# Patient Record
Sex: MALE | Race: OTHER
[De-identification: names, ages, dates, MRNs, and addresses within clinical notes are randomized per-mention and may not be internally consistent; named-entity substitution may affect disease eponyms.]

---

## 2018-04-15 ENCOUNTER — HOSPITAL ENCOUNTER (EMERGENCY)
Dept: HOSPITAL 8 - ED | Age: 28
Discharge: HOME | End: 2018-04-15
Payer: COMMERCIAL

## 2018-04-15 VITALS — HEIGHT: 74 IN | BODY MASS INDEX: 36.19 KG/M2 | WEIGHT: 281.97 LBS

## 2018-04-15 VITALS — SYSTOLIC BLOOD PRESSURE: 131 MMHG | DIASTOLIC BLOOD PRESSURE: 71 MMHG

## 2018-04-15 DIAGNOSIS — S61.011A: Primary | ICD-10-CM
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
## 2018-04-15 DIAGNOSIS — T23.471A: ICD-10-CM
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
## 2018-04-15 PROCEDURE — 12011 RPR F/E/E/N/L/M 2.5 CM/<: CPT

## 2018-04-15 PROCEDURE — 90715 TDAP VACCINE 7 YRS/> IM: CPT

## 2018-04-15 PROCEDURE — 90471 IMMUNIZATION ADMIN: CPT

## 2019-07-22 ENCOUNTER — HOSPITAL ENCOUNTER (EMERGENCY)
Facility: MEDICAL CENTER | Age: 29
End: 2019-07-22
Attending: EMERGENCY MEDICINE
Payer: COMMERCIAL

## 2019-07-22 ENCOUNTER — APPOINTMENT (OUTPATIENT)
Dept: RADIOLOGY | Facility: MEDICAL CENTER | Age: 29
End: 2019-07-22
Attending: EMERGENCY MEDICINE
Payer: COMMERCIAL

## 2019-07-22 VITALS
WEIGHT: 275 LBS | RESPIRATION RATE: 16 BRPM | OXYGEN SATURATION: 97 % | HEART RATE: 68 BPM | SYSTOLIC BLOOD PRESSURE: 119 MMHG | BODY MASS INDEX: 35.29 KG/M2 | DIASTOLIC BLOOD PRESSURE: 53 MMHG | HEIGHT: 74 IN | TEMPERATURE: 98.2 F

## 2019-07-22 DIAGNOSIS — S29.012A STRAIN OF THORACIC BACK REGION: ICD-10-CM

## 2019-07-22 PROCEDURE — A9270 NON-COVERED ITEM OR SERVICE: HCPCS | Performed by: EMERGENCY MEDICINE

## 2019-07-22 PROCEDURE — 72072 X-RAY EXAM THORAC SPINE 3VWS: CPT

## 2019-07-22 PROCEDURE — 700102 HCHG RX REV CODE 250 W/ 637 OVERRIDE(OP): Performed by: EMERGENCY MEDICINE

## 2019-07-22 PROCEDURE — 99284 EMERGENCY DEPT VISIT MOD MDM: CPT

## 2019-07-22 RX ORDER — IBUPROFEN 800 MG/1
800 TABLET ORAL EVERY 8 HOURS PRN
Qty: 30 TAB | Refills: 0 | Status: SHIPPED | OUTPATIENT
Start: 2019-07-22

## 2019-07-22 RX ORDER — CYCLOBENZAPRINE HCL 10 MG
10 TABLET ORAL 3 TIMES DAILY PRN
Qty: 30 TAB | Refills: 0 | Status: SHIPPED | OUTPATIENT
Start: 2019-07-22

## 2019-07-22 RX ORDER — HYDROCODONE BITARTRATE AND ACETAMINOPHEN 5; 325 MG/1; MG/1
2 TABLET ORAL ONCE
Status: COMPLETED | OUTPATIENT
Start: 2019-07-22 | End: 2019-07-22

## 2019-07-22 RX ADMIN — HYDROCODONE BITARTRATE AND ACETAMINOPHEN 2 TABLET: 5; 325 TABLET ORAL at 11:39

## 2019-07-22 NOTE — ED TRIAGE NOTES
Felt sharp pain in between shoulder blades post moving a heavy object at work. No hx of back injuries, no meds, hx or allergies.

## 2019-07-22 NOTE — ED PROVIDER NOTES
"ED Provider Note    CHIEF COMPLAINT  Chief Complaint   Patient presents with   • Back Pain       HPI  De Edwards is a 29 y.o. male who presents for evaluation of acute mid back pain while at work.  The patient was lifting a heavy object felt a popping sensation between his shoulder blades.  He denies chest pain.  He has no significant medical or surgical history.  No report of numbness weakness or tingling.  No prior history of neck or back surgery.  Injury occurred an hour ago.  Worse with movement no associated numbness weakness tingling no other complaint    REVIEW OF SYSTEMS  See HPI for further details no numbness weakness tingling chest pain ll other systems are negative.     PAST MEDICAL HISTORY  No past medical history on file.  No significant medical history  FAMILY HISTORY  Noncontributory    SOCIAL HISTORY  Social History     Social History   • Marital status: Single     Spouse name: N/A   • Number of children: N/A   • Years of education: N/A     Social History Main Topics   • Smoking status: Not on file   • Smokeless tobacco: Not on file   • Alcohol use Not on file   • Drug use: Unknown   • Sexual activity: Not on file     Other Topics Concern   • Not on file     Social History Narrative   • No narrative on file     Denies IV drug  SURGICAL HISTORY  No past surgical history on file.  No neck or back surgery  CURRENT MEDICATIONS  Home Medications     Reviewed by Heaven Henry R.N. (Registered Nurse) on 07/22/19 at 1009  Med List Status: Not Addressed   Medication Last Dose Status        Patient Bill Taking any Medications                       ALLERGIES  No Known Allergies    PHYSICAL EXAM  VITAL SIGNS: /80   Pulse 60   Temp 36.8 °C (98.2 °F) (Temporal)   Resp 14   Ht 1.88 m (6' 2\")   Wt 124.7 kg (275 lb)   SpO2 97%   BMI 35.31 kg/m²  Room air O2: 97    Constitutional: Patient appears uncomfortable  HENT: Normocephalic, Atraumatic, Bilateral external ears normal, " Oropharynx moist, No oral exudates, Nose normal.   Eyes: PERRLA, EOMI, Conjunctiva normal, No discharge.   Neck: Normal range of motion, No tenderness, Supple, No stridor.   Cardiovascular: Normal heart rate, Normal rhythm, No murmurs, No rubs, No gallops.   Thorax & Lungs: Normal breath sounds, No respiratory distress, No wheezing, No chest tenderness.   Abdomen: Bowel sounds normal, Soft, No tenderness, No masses, No pulsatile masses.   Skin: Warm, Dry, No erythema, No rash.   Back: Midline bony tenderness at T6-T7 without step-off  Extremities: Intact distal pulses, No edema, No tenderness, No cyanosis, No clubbing.   Neurologic: Alert & oriented x 3, Normal motor function, Normal sensory function, No focal deficits noted.   Psychiatric: Anxious.     RADIOLOGY/PROCEDURES  DX-THORACIC SPINE-WITH SWIMMERS VIEW   Final Result      Mild anterior wedging T11 and T12 vertebral bodies and appears to be old. No acute compression identified. If symptoms persist, CT would be a consideration for further evaluation.            COURSE & MEDICAL DECISION MAKING  Pertinent Labs & Imaging studies reviewed. (See chart for details)  Patient presents here with acute mid back pain there is no direct blunt or penetrating trauma.  Neurologically is intact.  He was in moderate pain was given Norco.  Radio graphs suggest some anterior wedging of T12 and T11 likely old.  Without any direct trauma this is likely chronic issue exacerbated by lifting.  He will be referred back to occupational health he can be referred to spine surgery and get CT scan as needed.  I will give him a prescription of high-dose ibuprofen and Flexeril    FINAL IMPRESSION  1.  Acute thoracic back strain         Electronically signed by: Shiva Wilcox, 7/22/2019 10:14 AM

## 2019-07-22 NOTE — ED NOTES
Cleared for DC per ER MD. DC instructions provided and patient verbalizes understanding. NAD noted. VSS. Patient amb with steady gait.

## 2019-07-22 NOTE — LETTER
"  FORM C-4:  EMPLOYEE’S CLAIM FOR COMPENSATION/ REPORT OF INITIAL TREATMENT  EMPLOYEE’S CLAIM - PROVIDE ALL INFORMATION REQUESTED   First Name  De Last Name  Ronni Justin Birthdate             Age  1990 29 y.o. 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 (if applicable)  Picking up a board    How did this injury or occupational disease occur? Be specific and answer in detail. 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  n/a Witnesses to the Accident  Kevin Karen     Nature of Injury or Occupational Disease  Contusion  Part(s) of Body Injured or Affected  Lower Back Area (Lumbar Area & Lumbo-Sacral), N/A, N/A    I certify that the above is true and correct to the best of my knowledge and that I have provided this information in order to obtain the benefits of [place] " [place]).  I hereby authorize any physician, chiropractor, surgeon, practitioner, or other person, any hospital, including [facility] hospital, any medical service organization, any insurance company, or other institution or organization to release to each other, any medical or other information, including benefits paid or payable, pertinent to this injury or disease, except information relative to diagnosis, treatment and/or counseling for AIDS, psychological conditions, alcohol or controlled substances, for which I must give specific authorization.  A Photostat of this authorization shall be as valid as the original.   Date                07/22/2019 [facility] Employee’s Signature   THIS REPORT MUST BE COMPLETED AND MAILED WITHIN 3 WORKING DAYS OF TREATMENT   Place  [facility] [place], [place], EMERGENCY DEPT  Name of Facility   [facility] [place] [place]   Date  7/22/2019 Diagnosis  (S29.012A) Strain of thoracic back region Is there evidence the injured employee was under the influence of alcohol and/or another controlled substance at the time of accident?   Hour  12:30 PM Description of Injury or Disease  Strain of thoracic back region No   Treatment  Physician evaluation and treatment of acute thoracic back strain  Have you advised the patient to remain off work five days or more?         Yes   X-Ray Findings  Positive  Comments:Possible wedging of T11 and T12   If Yes   From Date    To Date      From information given by the employee, together with medical evidence, can you directly connect this injury or occupational disease as job incurred?  Yes If No, is the employee capable of: Full Duty  No Modified Duty  No   Is additional medical care by a physician indicated?  Yes  Comments:Referral to occupational health If Modified Duty, Specify any Limitations / Restrictions  No  "working until following up with occupational health     Do you know of any previous injury or disease contributing to this condition or occupational disease?  No   Date  7/22/2019 Print Doctor’s Name  Shiva Wilcox I certify the employer’s copy of this form was mailed on:   07/22/2019   Address  [address]  [place] [address] [phone] Insurer’s Use Only   [facility]  [address]    Provider’s Tax ID Number  [id number] Telephone  Dept: [phone]    Doctor’s Signature  e-SHIVA Gonzales M.D. Degree   MD    Original - TREATING PHYSICIAN OR CHIROPRACTOR   Pg 2-Insurer/TPA   Pg 3-Employer   Pg 4-Employee                                                                                                  Form C-4 (rev01/03)     BRIEF DESCRIPTION OF RIGHTS AND BENEFITS  (Pursuant to [place] 616C.050)    Notice of Injury or Occupational Disease (Incident Report Form C-1): If an injury or occupational disease (OD) arises out of and in the course of employment, you must provide written notice to your employer as soon as practicable, but no later than 7 days after the accident or OD. Your employer shall maintain a sufficient supply of the required forms.    Claim for Compensation (Form C-4): If medical treatment is sought, the form C-4 is available at the place of initial treatment. A completed \"Claim for Compensation\" (Form C-4) must be filed within 90 days after an accident or OD. The treating physician or chiropractor must, within 3 working days after treatment, complete and mail to the employer, the employer's insurer and third-party , the Claim for Compensation.    Medical Treatment: If you require medical treatment for your on-the-job injury or OD, you may be required to select a physician or chiropractor from a list provided by your workers’ compensation insurer, if it has contracted with an Organization for Managed Care (MCO) or Preferred Provider Organization (PPO) or " providers of health care. If your employer has not entered into a contract with an MCO or PPO, you may select a physician or chiropractor from the Panel of Physicians and Chiropractors. Any medical costs related to your industrial injury or OD will be paid by your insurer.    Temporary Total Disability (TTD): If your doctor has certified that you are unable to work for a period of at least 5 consecutive days, or 5 cumulative days in a 20-day period, or places restrictions on you that your employer does not accommodate, you may be entitled to TTD compensation.    Temporary Partial Disability (TPD): If the wage you receive upon reemployment is less than the compensation for TTD to which you are entitled, the insurer may be required to pay you TPD compensation to make up the difference. TPD can only be paid for a maximum of 24 months.    Permanent Partial Disability (PPD): When your medical condition is stable and there is an indication of a PPD as a result of your injury or OD, within 30 days, your insurer must arrange for an evaluation by a rating physician or chiropractor to determine the degree of your PPD. The amount of your PPD award depends on the date of injury, the results of the PPD evaluation and your age and wage.    Permanent Total Disability (PTD): If you are medically certified by a treating physician or chiropractor as permanently and totally disabled and have been granted a PTD status by your insurer, you are entitled to receive monthly benefits not to exceed 66 2/3% of your average monthly wage. The amount of your PTD payments is subject to reduction if you previously received a PPD award.    Vocational Rehabilitation Services: You may be eligible for vocational rehabilitation services if you are unable to return to the job due to a permanent physical impairment or permanent restrictions as a result of your injury or occupational disease.    Transportation and Per Aileen Reimbursement: You may be  eligible for travel expenses and per cliff associated with medical treatment.  Reopening: You may be able to reopen your claim if your condition worsens after claim closure.    Appeal Process: If you disagree with a written determination issued by the insurer or the insurer does not respond to your request, you may appeal to the Department of Administration, , by following the instructions contained in your determination letter. You must appeal the determination within 70 days from the date of the determination letter at [address], [address], [place] [address], or [address], [place], [place] [address]. If you disagree with the  decision, you may appeal to the Department of Administration, . You must file your appeal within 30 days from the date of the  decision letter at [address], [address], [place], [place] [address], or [address], [address], [place], [place] [address]. If you disagree with a decision of an , you may file a petition for judicial review with the District Court. You must do so within 30 days of the Appeal Officer’s decision. You may be represented by an  at your own expense or you may contact the [facility] for possible representation.    [place]  for Injured Workers (NAIW): If you disagree with a  decision, you may request that NAIW represent you without charge at an  Hearing. For information regarding denial of benefits, you may contact the [facility] at: [address], [address], [place] [address], [phone]) [phone], or [address], [place] [address], [phone]    To File a Complaint with the Division: If you wish to file a complaint with the  of the Division of Industrial Relations (DIR), please contact the Workers’ Compensation Section, [address], [address], [place],  [place] [address], telephone ([phone]) [phone], or [address], [address], [place], [place] [address], telephone [phone]) [phone].    For assistance with Workers’ Compensation Issues: you may contact the Office of the Governor Consumer Health Assistance, [address] [place] [address], [address], [place], [place] [address], Toll Free 1-663.932.8545, Web site: [email or web address].[email or web address]., E-mail [email or web address].                                                                                                                                                                               __________________________________________________________________                                    _________07/22/2019________            Employee Name / Signature                                                                                                                            Date                                       D-2 (rev. 10/07)

## 2019-07-23 ENCOUNTER — OCCUPATIONAL MEDICINE (OUTPATIENT)
Dept: URGENT CARE | Facility: CLINIC | Age: 29
End: 2019-07-23
Payer: COMMERCIAL

## 2019-07-23 VITALS
DIASTOLIC BLOOD PRESSURE: 64 MMHG | OXYGEN SATURATION: 97 % | HEART RATE: 72 BPM | HEIGHT: 74 IN | RESPIRATION RATE: 14 BRPM | WEIGHT: 275 LBS | TEMPERATURE: 98.6 F | SYSTOLIC BLOOD PRESSURE: 122 MMHG | BODY MASS INDEX: 35.29 KG/M2

## 2019-07-23 DIAGNOSIS — S29.019D THORACIC MYOFASCIAL STRAIN, SUBSEQUENT ENCOUNTER: ICD-10-CM

## 2019-07-23 DIAGNOSIS — R25.2 SPASM: ICD-10-CM

## 2019-07-23 PROCEDURE — 99203 OFFICE O/P NEW LOW 30 MIN: CPT | Performed by: FAMILY MEDICINE

## 2019-07-23 RX ORDER — METHOCARBAMOL 750 MG/1
750 TABLET, FILM COATED ORAL 3 TIMES DAILY
Qty: 15 TAB | Refills: 0 | Status: SHIPPED | OUTPATIENT
Start: 2019-07-23 | End: 2019-07-28

## 2019-07-23 NOTE — LETTER
[address] [place] [address]  Phone:  [phone] - Fax:  [phone]   [facility] Progress Report and Disability Certification  Date of Service: 7/23/2019   No Show:  No  Date / Time of Next Visit: 7/26/2019@7:30 PM   Claim Information   Patient Name: De Edwards  Claim Number:     Employer:   [employer] Date of Injury: 7/22/2019     Insurer / TPA: [place] [employer]  ID / SSN:     Occupation: Plaster  Diagnosis: Diagnoses of Thoracic myofascial strain, subsequent encounter and Spasm were pertinent to this visit.    Medical Information   Related to Industrial Injury? Yes    Subjective Complaints:  DOI: 7/22/2019  F/u visit from ER for thoracic strain triggered by bending with heavy piece of wood to level ground. Has improved slightly.   Pain severity 8/10 without radiation. Worse with walking. Spasm takes breath away. Muscle relaxer and ibuprofen with minimal relief. No radiation. No myelopathy. No other aggravating or alleviating factors.     Objective Findings: Back: tender and spasm left parathoracic musculature. No midline bony tenderness or deformity. Pain reproduced with left trunk rotation.  Neuro:  =   Pre-Existing Condition(s):     Assessment:   Condition Improved    Status: Additional Care Required  Permanent Disability:No    Plan:   Comments:relative rest, ice, nsaid, muscle relaxer    Diagnostics:   Comments:reviewed from ER    Comments:       Disability Information   Status: Released to Restricted Duty    From:  7/23/2019  Through: 7/26/2019 Restrictions are: Temporary   Physical Restrictions   Sitting:    Standing:    Stooping:  < or = to 1 hr/day Bending:  < or = to 1 hr/day   Squatting:    Walking:  < or = to 2 hrs/day Climbing:    Pushing:  < or = to 2 hrs/day   Pulling:  < or = to 2 hrs/day Other:    Reaching Above Shoulder (L):   Reaching Above Shoulder (R):       Reaching Below Shoulder (L):    Reaching Below  Shoulder (R):      Not to exceed Weight Limits   Carrying(hrs): 2 Weight Limit(lb): < or = to 10 pounds Lifting(hrs): 2 Weight  Limit(lb): < or = to 10 pounds   Comments:      Repetitive Actions   Hands: i.e. Fine Manipulations from Grasping:     Feet: i.e. Operating Foot Controls:     Driving / Operate Machinery:     Physician Name: Prince Boogie M.D. Physician Signature: PRINCE Peng M.D. e-Signature: Dr. Akin Esquivel, Medical Director   Clinic Name / Location: [address] [place] [address], [place] [address] Clinic Phone Number: Dept: [phone]   Appointment Time: 8:15 Pm Visit Start Time: 8:15 PM   Check-In Time:  8:07 Pm Visit Discharge Time:  8:48 PM   Original-Treating Physician or Chiropractor    Page 2-Insurer/TPA    Page 3-Employer    Page 4-Employee

## 2019-07-27 ENCOUNTER — OCCUPATIONAL MEDICINE (OUTPATIENT)
Dept: URGENT CARE | Facility: CLINIC | Age: 29
End: 2019-07-27
Payer: COMMERCIAL

## 2019-07-27 VITALS
HEIGHT: 74 IN | SYSTOLIC BLOOD PRESSURE: 138 MMHG | RESPIRATION RATE: 12 BRPM | OXYGEN SATURATION: 98 % | TEMPERATURE: 97.4 F | DIASTOLIC BLOOD PRESSURE: 72 MMHG | WEIGHT: 288.2 LBS | BODY MASS INDEX: 36.99 KG/M2 | HEART RATE: 67 BPM

## 2019-07-27 DIAGNOSIS — S29.019D THORACIC MYOFASCIAL STRAIN, SUBSEQUENT ENCOUNTER: ICD-10-CM

## 2019-07-27 PROCEDURE — 99213 OFFICE O/P EST LOW 20 MIN: CPT | Mod: 29 | Performed by: NURSE PRACTITIONER

## 2019-07-27 ASSESSMENT — ENCOUNTER SYMPTOMS: BACK PAIN: 1

## 2019-07-27 NOTE — LETTER
[address] [place] [address]  Phone:  [phone] - Fax:  [phone]   [facility] Progress Report and Disability Certification  Date of Service: 7/27/2019   No Show:  No  Date / Time of Next Visit: 8/3/2019 7:30 PM   Claim Information   Patient Name: De Edwards  Claim Number:     Employer:   [employer] Date of Injury: 7/22/2019     Insurer / TPA: [place] Ronnie  ID / SSN:     Occupation: Plaster  Diagnosis: The encounter diagnosis was Thoracic myofascial strain, subsequent encounter.    Medical Information   Related to Industrial Injury?      Subjective Complaints:  DOI: 7/22/19  Patient states he has improved from his last follow up on 7/23.  States he is feeling less pain and fewer spasms to the thoracic area.  Pain is mainly produced now with movement such as lateral rotation and bending.  States he has been using icy-hot and alternating ice/heat with ibuprofen with moderate relief. Denies numbness, tingling or weakness.   Objective Findings: No point tenderness over the thoracic spine, no point tenderness over the injured area. Pain is reproduced with lateral rotation and lateral bending. Full ROM in the upper extremities without pain.    Pre-Existing Condition(s):     Assessment:   Condition Improved    Status:    Permanent Disability:     Plan: Medication  Comments:ibuprofen as needed    Diagnostics:      Comments:       Disability Information   Status: Released to Restricted Duty    From:  7/27/2019  Through: 8/3/2019 Restrictions are: Temporary   Physical Restrictions   Sitting:    Standing:    Stooping:  < or = to 1 hr/day Bending:  < or = to 1 hr/day   Squatting:    Walking:  < or = to 2 hrs/day Climbing:    Pushing:  < or = to 2 hrs/day   Pulling:  < or = to 2 hrs/day Other:    Reaching Above Shoulder (L):   Reaching Above Shoulder (R):       Reaching Below Shoulder (L):    Reaching Below Shoulder (R):      Not to  exceed Weight Limits   Carrying(hrs): 2 Weight Limit(lb): < or = to 10 pounds Lifting(hrs): 2 Weight  Limit(lb): < or = to 10 pounds   Comments: Continue ibuprofen as needed.  Ice/heat. Keep follow up visit with occupational health.    Repetitive Actions   Hands: i.e. Fine Manipulations from Grasping:     Feet: i.e. Operating Foot Controls:     Driving / Operate Machinery:     Physician Name: Cathey J Hamman, A.P.N. Physician Signature: e-SignHAMMAN, CATHEY J A.P.N. e-Signature: Dr. Akin Esquivel, Medical Director   Clinic Name / Location: [address] [place] [address], [place] [address] Clinic Phone Number: Dept: [phone]   Appointment Time: 7:30 Pm Visit Start Time: 7:40 PM   Check-In Time:  7:33 Pm Visit Discharge Time: 8:08 PM   Original-Treating Physician or Chiropractor    Page 2-Insurer/TPA    Page 3-Employer    Page 4-Employee

## 2019-07-28 ASSESSMENT — ENCOUNTER SYMPTOMS
WEIGHT LOSS: 0
NAUSEA: 0
SENSORY CHANGE: 0
FEVER: 0
FLANK PAIN: 0
VOMITING: 0
CHILLS: 0
FOCAL WEAKNESS: 0

## 2019-07-28 NOTE — PROGRESS NOTES
"Subjective:      De Edwards is a 29 y.o. male who presents with Work-Related Injury ( [facility] DOI: 07-22-19 Thoracic Myofascial strain)      DOI: 7/22/19  Patient states he has improved from his last follow up on 7/23.  States he is feeling less pain and fewer spasms to the thoracic area.  Pain is mainly produced now with movement such as lateral rotation and bending.  States he has been using icy-hot and alternating ice/heat with ibuprofen with moderate relief. Denies numbness, tingling or weakness.     Other   This is a new problem. Episode onset: 7/22/19. The problem occurs constantly. The problem has been gradually improving. The symptoms are aggravated by bending and twisting. He has tried ice, NSAIDs, heat, rest and relaxation for the symptoms. The treatment provided moderate relief.       Review of Systems   Musculoskeletal: Positive for back pain.   All other systems reviewed and are negative.         Objective:     /72   Pulse 67   Temp 36.3 °C (97.4 °F) (Temporal)   Resp 12   Ht 1.88 m (6' 2\")   Wt (!) 130.7 kg (288 lb 3.2 oz)   SpO2 98%   BMI 37.00 kg/m²      Physical Exam   Constitutional: He appears well-developed and well-nourished.   Musculoskeletal:        Back:    Vitals reviewed.      No point tenderness over the thoracic spine, no point tenderness over the injured area. Pain is reproduced with lateral rotation and lateral bending. Full ROM in the upper extremities without pain.      5/5 and equal in the upper extremities  Push/pull 5/5 and equal in the upper extremities.     Assessment/Plan:     1. Thoracic myofascial strain, subsequent encounter    See D39 for restrictions  Ice/heat  Ibuprofen  Keep follow up visit in occupational health       "

## 2019-07-28 NOTE — PROGRESS NOTES
"Subjective:      De Edwards is a 29 y.o. male who presents with Follow-Up (WC Lt back pain not getting better and pt states the medication is not working )      DOI: 7/22/2019  F/u visit from ER for thoracic strain triggered by bending with heavy piece of wood to level ground. Has improved slightly.   Pain severity 8/10 without radiation. Worse with walking. Spasm takes breath away. Muscle relaxer and ibuprofen with minimal relief. No radiation. No myelopathy. No other aggravating or alleviating factors.       HPI    Review of Systems   Constitutional: Negative for chills, fever and weight loss.   Gastrointestinal: Negative for nausea and vomiting.   Genitourinary: Negative for flank pain and hematuria.   Neurological: Negative for sensory change and focal weakness.     .  Medications, Allergies, and current problem list reviewed today in Epic       Objective:     /64   Pulse 72   Temp 37 °C (98.6 °F)   Resp 14   Ht 1.88 m (6' 2\")   Wt 124.7 kg (275 lb)   SpO2 97%   BMI 35.31 kg/m²      Physical Exam   Constitutional: He is oriented to person, place, and time. He appears well-developed and well-nourished. No distress.   HENT:   Head: Normocephalic and atraumatic.   Neurological: He is alert and oriented to person, place, and time.   Gait stable   Skin: Skin is warm and dry. No rash noted.       Back: tender and spasm left parathoracic musculature. No midline bony tenderness or deformity. Pain reproduced with left trunk rotation.  Neuro:  =       Assessment/Plan:     1. Thoracic myofascial strain, subsequent encounter      2. Spasm    - methocarbamol (ROBAXIN) 750 MG Tab; Take 1 Tab by mouth 3 times a day for 5 days.  Dispense: 15 Tab; Refill: 0    My duty restrictions on D 39.  We will try different muscle relaxer.  Continue NSAID.  "

## 2019-08-03 ENCOUNTER — OCCUPATIONAL MEDICINE (OUTPATIENT)
Dept: URGENT CARE | Facility: CLINIC | Age: 29
End: 2019-08-03
Payer: COMMERCIAL

## 2019-08-03 VITALS
HEART RATE: 81 BPM | SYSTOLIC BLOOD PRESSURE: 124 MMHG | DIASTOLIC BLOOD PRESSURE: 64 MMHG | TEMPERATURE: 98.1 F | WEIGHT: 275.2 LBS | OXYGEN SATURATION: 97 % | RESPIRATION RATE: 12 BRPM | HEIGHT: 74 IN | BODY MASS INDEX: 35.32 KG/M2

## 2019-08-03 DIAGNOSIS — S29.019D THORACIC MYOFASCIAL STRAIN, SUBSEQUENT ENCOUNTER: ICD-10-CM

## 2019-08-03 PROCEDURE — 99213 OFFICE O/P EST LOW 20 MIN: CPT | Mod: 29 | Performed by: PHYSICIAN ASSISTANT

## 2019-08-03 ASSESSMENT — ENCOUNTER SYMPTOMS
BACK PAIN: 0
TINGLING: 0

## 2019-08-03 NOTE — LETTER
[address] [place] [address]  Phone:  [phone] - Fax:  [phone]   [facility] Progress Report and Disability Certification  Date of Service: 8/3/2019   No Show:  No  Date / Time of Next Visit: 8/10/2019 7:30 PM   Claim Information   Patient Name: De Edwards  Claim Number:     Employer:    Date of Injury: 7/22/2019     Insurer / TPA: [employer]  ID / SSN:     Occupation: Plaster  Diagnosis: The encounter diagnosis was Thoracic myofascial strain, subsequent encounter.    Medical Information   Related to Industrial Injury? Yes    Subjective Complaints:  DOI 7/22/2019: Patient presents for thoracic back injury that occurred at work when picking up boards and bending over.  Patient states he has been progressively increasing the amount of work he does and feels no pain or issues with his back.  He states he is ready to have a trial of full duty at this point in time.  He does still occasionally use muscle relaxers as needed as well as IcyHot on the area for relief.  Patient states he feels like he has back to about 80%.   Objective Findings: Patient has 5/5 strength in upper extremities against resistance of extension and flexion.  Nontender to palpation of midline and thoracic back.  Patient is able to bend over with no pain.   Pre-Existing Condition(s):     Assessment:   Condition Improved    Status: Additional Care Required  Permanent Disability:No    Plan:      Diagnostics:      Comments:       Disability Information   Status: Released to Full Duty    From:  8/3/2019  Through: 8/10/2019 Restrictions are: Temporary   Physical Restrictions   Sitting:    Standing:    Stooping:    Bending:      Squatting:    Walking:    Climbing:    Pushing:      Pulling:    Other:    Reaching Above Shoulder (L):   Reaching Above Shoulder (R):       Reaching Below Shoulder (L):    Reaching Below Shoulder (R):      Not to exceed Weight  Limits   Carrying(hrs):   Weight Limit(lb):   Lifting(hrs):   Weight  Limit(lb):     Comments: Patient to have a trial of full duty.  He will follow-up for reevaluation in 1 week.  Continue using icy hot on the area as needed and muscle relaxer only sparingly as needed.  Likely will be discharged at next visit with similar progression as he has seen with previous visits.    Repetitive Actions   Hands: i.e. Fine Manipulations from Grasping:     Feet: i.e. Operating Foot Controls:     Driving / Operate Machinery:     Physician Name: Michael Castillo P.A.-C. Physician Signature:   e-Signature: Dr. Akin Esquivel, Medical Director   Clinic Name / Location: [address] [place] [address] Clinic Phone Number: Dept: [phone]   Appointment Time: 7:30 Pm Visit Start Time: 7:59 PM   Check-In Time:  7:29 Pm Visit Discharge Time: 8:28 PM   Original-Treating Physician or Chiropractor    Page 2-Insurer/TPA    Page 3-Employer    Page 4-Employee

## 2019-08-04 NOTE — PROGRESS NOTES
"Subjective:      De Edwards is a 29 y.o. male who presents with Work-Related Injury ( [facility] DOI: 07-22-19 Myofascial strain)      DOI 7/22/2019: Patient presents for thoracic back injury that occurred at work when picking up boards and bending over.  Patient states he has been progressively increasing the amount of work he does and feels no pain or issues with his back.  He states he is ready to have a trial of full duty at this point in time.  He does still occasionally use muscle relaxers as needed as well as IcyHot on the area for relief.  Patient states he feels like he has back to about 80%.     HPI  Patient presents for follow-up of work-related injury as described above.  Review of Systems   Musculoskeletal: Negative for back pain.   Neurological: Negative for tingling.     PMH: No pertinent past medical history to this problem  MEDS: Medications were reviewed in Epic  ALLERGIES: Allergies were reviewed in Epic  SOCHX: Works as a plaster technician.  FH: No pertinent family history to this problem       Objective:     /64   Pulse 81   Temp 36.7 °C (98.1 °F) (Temporal)   Resp 12   Ht 1.88 m (6' 2\")   Wt 124.8 kg (275 lb 3.2 oz)   SpO2 97%   BMI 35.33 kg/m²      Physical Exam   Constitutional: Vital signs are normal. He appears well-developed and well-nourished. No distress.   HENT:   Head: Normocephalic and atraumatic.   Right Ear: Hearing normal.   Left Ear: Hearing normal.   Eyes: Pupils are equal, round, and reactive to light.   Cardiovascular: Normal rate, regular rhythm and normal heart sounds.   Pulmonary/Chest: Effort normal.   Musculoskeletal:   Thoracic back as described below   Neurological: He is alert. Coordination normal.   Skin: Skin is warm and dry.   Psychiatric: He has a normal mood and affect.   Nursing note and vitals reviewed.      Patient has 5/5 strength in upper extremities against resistance of extension and flexion.  Nontender to palpation of midline and " thoracic back.  Patient is able to bend over with no pain.       Assessment/Plan:   1. Thoracic myofascial strain, subsequent encounter  Patient to have a trial of full duty.  He will follow-up for reevaluation in 1 week.  Continue using icy hot on the area as needed and muscle relaxer only sparingly as needed.  Likely will be discharged at next visit with similar progression as he has seen with previous visits.   Patient agreeable to plan  Michael Castillo PA-C

## 2019-11-19 ENCOUNTER — HOSPITAL ENCOUNTER (EMERGENCY)
Facility: MEDICAL CENTER | Age: 29
End: 2019-11-19
Attending: EMERGENCY MEDICINE

## 2019-11-19 ENCOUNTER — APPOINTMENT (OUTPATIENT)
Dept: RADIOLOGY | Facility: MEDICAL CENTER | Age: 29
End: 2019-11-19
Attending: EMERGENCY MEDICINE

## 2019-11-19 VITALS
OXYGEN SATURATION: 97 % | HEIGHT: 74 IN | HEART RATE: 59 BPM | DIASTOLIC BLOOD PRESSURE: 61 MMHG | SYSTOLIC BLOOD PRESSURE: 119 MMHG | RESPIRATION RATE: 16 BRPM | TEMPERATURE: 97 F | BODY MASS INDEX: 35.93 KG/M2 | WEIGHT: 279.98 LBS

## 2019-11-19 DIAGNOSIS — S93.492A SPRAIN OF ANTERIOR TALOFIBULAR LIGAMENT OF LEFT ANKLE, INITIAL ENCOUNTER: ICD-10-CM

## 2019-11-19 PROCEDURE — 302874 HCHG BANDAGE ACE 2 OR 3""

## 2019-11-19 PROCEDURE — 29515 APPLICATION SHORT LEG SPLINT: CPT

## 2019-11-19 PROCEDURE — 99284 EMERGENCY DEPT VISIT MOD MDM: CPT

## 2019-11-19 PROCEDURE — 73610 X-RAY EXAM OF ANKLE: CPT | Mod: LT

## 2019-11-19 PROCEDURE — 302875 HCHG BANDAGE ACE 4 OR 6""

## 2019-11-19 NOTE — ED NOTES
Discharged to home with instructions to follow up with ortho, elevate and ice leg and use crutches.

## 2019-11-19 NOTE — ED PROVIDER NOTES
"ED Provider Note      CHIEF COMPLAINT   Chief Complaint   Patient presents with   • Ankle Injury     left, jumped off a ladder last night and now co left ankle pain.       HPI   De Edwards is a 29 y.o. male who presents with left ankle pain.  Stepped off a ladder.  Twisted ankle.  Injury occurred last night.  Progressive swelling and increased pain today.  Throbbing nonradiating worse with ambulation took ibuprofen before coming in.  No numbness tingling weakness.  No other trauma.    REVIEW OF SYSTEMS   Pertinent negative: As above    PAST MEDICAL HISTORY   No past medical history on file.    SOCIAL HISTORY  Social History     Tobacco Use   • Smoking status: Current Some Day Smoker   • Smokeless tobacco: Never Used   Substance Use Topics   • Alcohol use: No   • Drug use: No       ALLERGIES   See chart    PHYSICAL EXAM  VITAL SIGNS: /61   Pulse (!) 59   Temp 36.1 °C (97 °F)   Resp 16   Ht 1.88 m (6' 2\")   Wt (!) 127 kg (279 lb 15.8 oz)   SpO2 97%   BMI 35.95 kg/m²   Head: Atraumatic  Eyes: Eyes normal inspection  Neck: has full range of motion, normal inspection.  Constitutional: No acute distress   Cardiovascular: Normal heart rate. Pulses dorsalis pedis  Thorax & Lungs: No respiratory distress  Skin: Intact  Musculoskeletal: Dorsalis pedis.  Tenderness lateral malleolus with associated swelling.  Compartments soft.  Neurologic:  Normal sensory and motor    RADIOLOGY/PROCEDURES  DX-ANKLE 3+ VIEWS LEFT   Final Result      Soft tissue swelling without definite acute displaced fracture      Periarticular calcifications and anterior tibial spurring most likely reflect remote injuries although superimposed acute avulsion fractures are not excluded         Imaging is interpreted by radiologist reviewed by me    COURSE & MEDICAL DECISION MAKING  Analgesia for possible fracture-ibuprofen before coming in    She presents with inversion injury left ankle.  Took ibuprofen before coming in.  X-ray " was obtained and there is no definite acute fracture although has calcifications distal to the malleoli.  Patient will be placed in a posterior short leg splint on crutches.  Referred to orthopedics for recheck in 1 week to rule out fracture.  Return the ER for worsening or uncontrolled pain or concern.    FINAL IMPRESSION  1.  Left ankle sprain, rule out fracture      This dictation was created using voice recognition software. The accuracy of the dictation is limited to the abilities of the software. I expect there may be some errors of grammar and possibly content. The nursing notes were reviewed and certain aspects of this information were incorporated into this note.      Electronically signed by: Hilton Garcia, 11/19/2019 12:35 PM

## 2019-11-19 NOTE — ED TRIAGE NOTES
Chief Complaint   Patient presents with   • Ankle Injury     left, jumped off a ladder last night and now co left ankle pain.

## 2021-03-03 ENCOUNTER — HOSPITAL ENCOUNTER (OUTPATIENT)
Dept: RADIOLOGY | Facility: MEDICAL CENTER | Age: 31
End: 2021-03-03
Attending: NURSE PRACTITIONER
Payer: COMMERCIAL

## 2021-03-03 DIAGNOSIS — R05.9 COUGH: ICD-10-CM

## 2021-03-03 PROCEDURE — 71046 X-RAY EXAM CHEST 2 VIEWS: CPT

## 2021-03-16 ENCOUNTER — HOSPITAL ENCOUNTER (OUTPATIENT)
Dept: LAB | Facility: MEDICAL CENTER | Age: 31
End: 2021-03-16
Attending: NURSE PRACTITIONER
Payer: COMMERCIAL

## 2021-03-16 LAB
BASOPHILS # BLD AUTO: 0.9 % (ref 0–1.8)
BASOPHILS # BLD: 0.08 K/UL (ref 0–0.12)
EOSINOPHIL # BLD AUTO: 0.28 K/UL (ref 0–0.51)
EOSINOPHIL NFR BLD: 3.1 % (ref 0–6.9)
ERYTHROCYTE [DISTWIDTH] IN BLOOD BY AUTOMATED COUNT: 44.8 FL (ref 35.9–50)
HCT VFR BLD AUTO: 46.7 % (ref 42–52)
HGB BLD-MCNC: 15.7 G/DL (ref 14–18)
IMM GRANULOCYTES # BLD AUTO: 0.04 K/UL (ref 0–0.11)
IMM GRANULOCYTES NFR BLD AUTO: 0.4 % (ref 0–0.9)
LYMPHOCYTES # BLD AUTO: 2.99 K/UL (ref 1–4.8)
LYMPHOCYTES NFR BLD: 32.6 % (ref 22–41)
MCH RBC QN AUTO: 31 PG (ref 27–33)
MCHC RBC AUTO-ENTMCNC: 33.6 G/DL (ref 33.7–35.3)
MCV RBC AUTO: 92.1 FL (ref 81.4–97.8)
MONOCYTES # BLD AUTO: 0.56 K/UL (ref 0–0.85)
MONOCYTES NFR BLD AUTO: 6.1 % (ref 0–13.4)
NEUTROPHILS # BLD AUTO: 5.23 K/UL (ref 1.82–7.42)
NEUTROPHILS NFR BLD: 56.9 % (ref 44–72)
NRBC # BLD AUTO: 0 K/UL
NRBC BLD-RTO: 0 /100 WBC
PLATELET # BLD AUTO: 275 K/UL (ref 164–446)
PMV BLD AUTO: 9.4 FL (ref 9–12.9)
RBC # BLD AUTO: 5.07 M/UL (ref 4.7–6.1)
WBC # BLD AUTO: 9.2 K/UL (ref 4.8–10.8)

## 2021-03-16 PROCEDURE — 87591 N.GONORRHOEAE DNA AMP PROB: CPT

## 2021-03-16 PROCEDURE — 80061 LIPID PANEL: CPT

## 2021-03-16 PROCEDURE — 80053 COMPREHEN METABOLIC PANEL: CPT

## 2021-03-16 PROCEDURE — 87389 HIV-1 AG W/HIV-1&-2 AB AG IA: CPT

## 2021-03-16 PROCEDURE — 87491 CHLMYD TRACH DNA AMP PROBE: CPT

## 2021-03-16 PROCEDURE — 84443 ASSAY THYROID STIM HORMONE: CPT

## 2021-03-16 PROCEDURE — 87086 URINE CULTURE/COLONY COUNT: CPT

## 2021-03-16 PROCEDURE — 86780 TREPONEMA PALLIDUM: CPT

## 2021-03-16 PROCEDURE — 85025 COMPLETE CBC W/AUTO DIFF WBC: CPT

## 2021-03-16 PROCEDURE — 36415 COLL VENOUS BLD VENIPUNCTURE: CPT

## 2021-03-17 LAB
ALBUMIN SERPL BCP-MCNC: 4.5 G/DL (ref 3.2–4.9)
ALBUMIN/GLOB SERPL: 1.4 G/DL
ALP SERPL-CCNC: 68 U/L (ref 30–99)
ALT SERPL-CCNC: 17 U/L (ref 2–50)
ANION GAP SERPL CALC-SCNC: 13 MMOL/L (ref 7–16)
AST SERPL-CCNC: 21 U/L (ref 12–45)
BILIRUB SERPL-MCNC: 0.5 MG/DL (ref 0.1–1.5)
BUN SERPL-MCNC: 17 MG/DL (ref 8–22)
C TRACH DNA SPEC QL NAA+PROBE: NEGATIVE
CALCIUM SERPL-MCNC: 9.4 MG/DL (ref 8.5–10.5)
CHLORIDE SERPL-SCNC: 100 MMOL/L (ref 96–112)
CHOLEST SERPL-MCNC: 168 MG/DL (ref 100–199)
CO2 SERPL-SCNC: 22 MMOL/L (ref 20–33)
CREAT SERPL-MCNC: 0.84 MG/DL (ref 0.5–1.4)
GLOBULIN SER CALC-MCNC: 3.2 G/DL (ref 1.9–3.5)
GLUCOSE SERPL-MCNC: 74 MG/DL (ref 65–99)
HDLC SERPL-MCNC: 47 MG/DL
HIV 1+2 AB+HIV1 P24 AG SERPL QL IA: NORMAL
LDLC SERPL CALC-MCNC: 100 MG/DL
N GONORRHOEA DNA SPEC QL NAA+PROBE: NEGATIVE
POTASSIUM SERPL-SCNC: 4 MMOL/L (ref 3.6–5.5)
PROT SERPL-MCNC: 7.7 G/DL (ref 6–8.2)
SODIUM SERPL-SCNC: 135 MMOL/L (ref 135–145)
SPECIMEN SOURCE: NORMAL
TREPONEMA PALLIDUM IGG+IGM AB [PRESENCE] IN SERUM OR PLASMA BY IMMUNOASSAY: NORMAL
TRIGL SERPL-MCNC: 105 MG/DL (ref 0–149)
TSH SERPL DL<=0.005 MIU/L-ACNC: 1.85 UIU/ML (ref 0.38–5.33)

## 2021-03-18 LAB
BACTERIA UR CULT: NORMAL
SIGNIFICANT IND 70042: NORMAL
SITE SITE: NORMAL
SOURCE SOURCE: NORMAL